# Patient Record
Sex: FEMALE | Race: BLACK OR AFRICAN AMERICAN | NOT HISPANIC OR LATINO | Employment: FULL TIME | URBAN - METROPOLITAN AREA
[De-identification: names, ages, dates, MRNs, and addresses within clinical notes are randomized per-mention and may not be internally consistent; named-entity substitution may affect disease eponyms.]

---

## 2017-05-02 ENCOUNTER — APPOINTMENT (OUTPATIENT)
Dept: WOMENS IMAGING | Facility: HOSPITAL | Age: 52
End: 2017-05-02

## 2017-05-02 PROCEDURE — G0202 SCR MAMMO BI INCL CAD: HCPCS | Performed by: RADIOLOGY

## 2017-05-17 ENCOUNTER — HOSPITAL ENCOUNTER (OUTPATIENT)
Dept: SLEEP MEDICINE | Facility: HOSPITAL | Age: 52
Discharge: HOME | End: 2017-05-17
Admitting: FAMILY MEDICINE

## 2017-05-17 PROCEDURE — 99213 OFFICE O/P EST LOW 20 MIN: CPT | Performed by: INTERNAL MEDICINE

## 2017-05-17 PROCEDURE — G0463 HOSPITAL OUTPT CLINIC VISIT: HCPCS

## 2017-09-28 ENCOUNTER — TELEPHONE (OUTPATIENT)
Dept: PULMONOLOGY | Facility: HOSPITAL | Age: 52
End: 2017-09-28

## 2017-09-28 NOTE — TELEPHONE ENCOUNTER
[facility] requested a refill on modafinil 200 mg one by mouth every morning #90 with 1 refill completed.

## 2018-01-31 ENCOUNTER — APPOINTMENT (OUTPATIENT)
Dept: SLEEP MEDICINE | Facility: HOSPITAL | Age: 53
End: 2018-01-31
Attending: INTERNAL MEDICINE

## 2018-02-15 ENCOUNTER — OFFICE VISIT (OUTPATIENT)
Dept: SLEEP MEDICINE | Facility: HOSPITAL | Age: 53
End: 2018-02-15
Attending: INTERNAL MEDICINE

## 2018-02-15 DIAGNOSIS — G47.11 IDIOPATHIC HYPERSOMNOLENCE: ICD-10-CM

## 2018-02-15 DIAGNOSIS — G47.419 PRIMARY NARCOLEPSY WITHOUT CATAPLEXY: Primary | ICD-10-CM

## 2018-02-15 PROCEDURE — 99213 OFFICE O/P EST LOW 20 MIN: CPT | Performed by: INTERNAL MEDICINE

## 2018-02-15 PROCEDURE — G0463 HOSPITAL OUTPT CLINIC VISIT: HCPCS

## 2018-02-15 RX ORDER — ARMODAFINIL 250 MG/1
250 TABLET ORAL DAILY
Qty: 90 TABLET | Refills: 0 | Status: SHIPPED | OUTPATIENT
Start: 2018-02-15 | End: 2018-05-16

## 2018-02-15 NOTE — PROGRESS NOTES
Follow Up [facility] Note       Patient Care Team:  Clara Ann Pallares, MD as PCP - General (Internal Medicine)  Gordo Mcmahan MD as Consulting Physician (Sleep Medicine)    Chief Complaint:  Hypersomnolence      Interval History:   The patient was last seen by me in May 2017.  She is unchanged and she still states she has complaints of excessive sleepiness.  She also states she is experiencing insomnia more?  She goes to bed at 10:30 PM and awakens around 9 AM.  She awakens by her alarm.  Her [place] Sleepiness Scale is abnormal at 20.    On weekends she works between 8 and 9 AM until 5:30 PM.  She is all Monday Tuesday and Wednesday and Friday.  On Thursday she works 4 PM to 10:30 PM.    She states she has been diagnosed prediabetic and prehypertensive.    Review of Systems:  Recorded on the Sleep Questionnaire.  Unremarkable except for anxiety.    Social History:  She has 1 caffeinated beverage a week.  Social History     Social History   • Marital status:      Spouse name: N/A   • Number of children: N/A   • Years of education: N/A     Social History Main Topics   • Smoking status: Never Smoker   • Smokeless tobacco: Never Used   • Alcohol use No   • Drug use: No   • Sexual activity: Not Asked     Other Topics Concern   • None     Social History Narrative   • None       Allergies:  Review of patient's allergies indicates no known allergies.     Medication Review:  Reviewed.  Modafinil 200 mg every morning and certrizine at bedtime.    Vital Signs:  Height 60 inches and weight 155 and she is obese with a body mass index of 30-31.    Physical Exam:    Constitutional:  Well developed black female and appears in no apparent distress.  Awake & oriented times 3.  Normal mood with normal recent and remote memory and normal judgement.  Eyes:  Conjunctivae normal.  Oropharynx:  moist mucous membranes without exudate and a large tongue and class III MP airway and posterior pharyngeal region not  well seen.      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      Plan:  Good sleep hygiene measures should be maintained.  Weight loss would be beneficial in this patient who is obese by BMI.  The patient is benefiting from the treatment being provided.     The patient will continue modafinil 200 mg by mouth every morning.  However, she wishes to go back to armodafinil 250 mg by mouth every morning with her next prescription.  If she uses armodafinil, she should use one half or one tablet by mouth every morning.    The patient will call for any problems and will follow up in 6 or 8 months.      Gordo Mcmahan MD  02/17/18  11:49 AM

## 2018-02-17 PROBLEM — G47.419 NARCOLEPTIC SYNDROME: Status: ACTIVE | Noted: 2018-02-17

## 2018-02-17 PROBLEM — G47.11 IDIOPATHIC HYPERSOMNOLENCE: Status: ACTIVE | Noted: 2018-02-17

## 2018-02-17 PROBLEM — G47.14 HYPERSOMNIA DUE TO MEDICAL CONDITION: Status: RESOLVED | Noted: 2018-02-17 | Resolved: 2018-02-17

## 2018-02-17 PROBLEM — G47.14 HYPERSOMNIA DUE TO MEDICAL CONDITION: Status: ACTIVE | Noted: 2018-02-17

## 2018-05-29 ENCOUNTER — APPOINTMENT (OUTPATIENT)
Dept: WOMENS IMAGING | Facility: HOSPITAL | Age: 53
End: 2018-05-29

## 2018-05-29 PROCEDURE — 77063 BREAST TOMOSYNTHESIS BI: CPT | Performed by: RADIOLOGY

## 2018-05-29 PROCEDURE — 77067 SCR MAMMO BI INCL CAD: CPT | Performed by: RADIOLOGY

## 2018-06-22 RX ORDER — MODAFINIL 200 MG/1
TABLET ORAL
Qty: 30 TABLET | Refills: 1 | Status: SHIPPED | OUTPATIENT
Start: 2018-06-22 | End: 2018-10-21 | Stop reason: SDUPTHER

## 2018-10-21 RX ORDER — MODAFINIL 200 MG/1
TABLET ORAL
Qty: 30 TABLET | Refills: 1 | Status: SHIPPED | OUTPATIENT
Start: 2018-10-21 | End: 2019-02-20

## 2019-02-20 RX ORDER — MODAFINIL 200 MG/1
TABLET ORAL
Qty: 90 TABLET | Refills: 0 | Status: SHIPPED | OUTPATIENT
Start: 2019-02-20 | End: 2019-03-06

## 2019-02-27 ENCOUNTER — APPOINTMENT (OUTPATIENT)
Dept: SLEEP MEDICINE | Facility: HOSPITAL | Age: 54
End: 2019-02-27
Attending: INTERNAL MEDICINE

## 2019-03-06 ENCOUNTER — OFFICE VISIT (OUTPATIENT)
Dept: SLEEP MEDICINE | Facility: HOSPITAL | Age: 54
End: 2019-03-06
Attending: INTERNAL MEDICINE

## 2019-03-06 VITALS — BODY MASS INDEX: 29.33 KG/M2 | HEIGHT: 60 IN | WEIGHT: 149.4 LBS

## 2019-03-06 DIAGNOSIS — G47.419 PRIMARY NARCOLEPSY WITHOUT CATAPLEXY: Primary | ICD-10-CM

## 2019-03-06 DIAGNOSIS — G47.11 IDIOPATHIC HYPERSOMNOLENCE: ICD-10-CM

## 2019-03-06 PROCEDURE — 99213 OFFICE O/P EST LOW 20 MIN: CPT | Performed by: INTERNAL MEDICINE

## 2019-03-06 PROCEDURE — G0463 HOSPITAL OUTPT CLINIC VISIT: HCPCS

## 2019-03-06 RX ORDER — MODAFINIL 200 MG/1
TABLET ORAL
Qty: 90 TABLET | Refills: 0 | Status: SHIPPED | OUTPATIENT
Start: 2019-03-06 | End: 2019-09-23 | Stop reason: SDUPTHER

## 2019-03-06 RX ORDER — CETIRIZINE HYDROCHLORIDE 10 MG/1
10 TABLET ORAL DAILY
COMMUNITY

## 2019-03-06 NOTE — PROGRESS NOTES
"Follow Up [facility] Note     Chief Complaint: Hypersomnolence    Primary Care Physician: Pallares, Clara Ann, MD    Interval History:   I last saw the patient 2/15/2018.  She reports she is unchanged.  However, since September, she has had some increasing insomnia?  She goes to bed around midnight and awakens around 8:30 AM.  [place] Sleepiness Scale is still abnormal at 18.  She continues to work the same work shift as before.    Review of Systems:  Recorded on the Sleep Questionnaire.  Unremarkable     Social History:    Social History     Socioeconomic History   • Marital status:      Spouse name: Not on file   • Number of children: Not on file   • Years of education: Not on file   • Highest education level: Not on file   Tobacco Use   • Smoking status: Never Smoker   • Smokeless tobacco: Never Used   Substance and Sexual Activity   • Alcohol use: No   • Drug use: No       Allergies:  Patient has no known allergies.     Medication Review:  Reviewed.      Vital Signs:    Vitals:    03/06/19 1148   Weight: 67.8 kg (149 lb 6.4 oz)   Height: 152.4 cm (60\")     Body mass index is 29.18 kg/m².    Physical Exam:    Constitutional:  Well developed 53 y.o. female that appears in no apparent distress.  Awake & oriented times 3.  Normal mood with normal recent and remote memory and normal judgement.  Eyes:  Conjunctivae normal.  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    Plan:  Good sleep hygiene measures should be maintained.  Weight loss would be beneficial in this patient who is nearly obese by BMI.  The patient is benefiting " from the treatment being provided.     The patient will continue modafinil 200 mg at the beginning of her work shift.  If she has complaints of insomnia the night before, she should only take one half tab.    The patient will call for any problems and will follow up in 9 months to 1 year.      Gordo Mcmahan MD  Sleep Medicine  03/06/19  12:25 PM

## 2019-05-17 ENCOUNTER — APPOINTMENT (OUTPATIENT)
Dept: WOMENS IMAGING | Facility: HOSPITAL | Age: 54
End: 2019-05-17

## 2019-05-17 PROCEDURE — G0279 TOMOSYNTHESIS, MAMMO: HCPCS | Performed by: RADIOLOGY

## 2019-05-17 PROCEDURE — 77066 DX MAMMO INCL CAD BI: CPT | Performed by: RADIOLOGY

## 2019-05-17 PROCEDURE — 76641 ULTRASOUND BREAST COMPLETE: CPT | Performed by: RADIOLOGY

## 2019-09-23 RX ORDER — MODAFINIL 200 MG/1
TABLET ORAL
Qty: 90 TABLET | Refills: 0 | Status: SHIPPED | OUTPATIENT
Start: 2019-09-23 | End: 2020-01-07 | Stop reason: SDUPTHER

## 2020-01-07 DIAGNOSIS — G47.419 PRIMARY NARCOLEPSY WITHOUT CATAPLEXY: Primary | ICD-10-CM

## 2020-01-07 RX ORDER — MODAFINIL 200 MG/1
TABLET ORAL
Qty: 90 TABLET | Refills: 0 | Status: SHIPPED | OUTPATIENT
Start: 2020-01-07 | End: 2020-03-23 | Stop reason: SDUPTHER

## 2020-03-23 DIAGNOSIS — G47.419 PRIMARY NARCOLEPSY WITHOUT CATAPLEXY: ICD-10-CM

## 2020-03-23 RX ORDER — MODAFINIL 200 MG/1
TABLET ORAL
Qty: 90 TABLET | Refills: 0 | Status: SHIPPED | OUTPATIENT
Start: 2020-03-23 | End: 2020-07-09 | Stop reason: SDUPTHER

## 2020-04-23 ENCOUNTER — APPOINTMENT (OUTPATIENT)
Dept: SLEEP MEDICINE | Facility: HOSPITAL | Age: 55
End: 2020-04-23

## 2020-07-09 DIAGNOSIS — G47.419 PRIMARY NARCOLEPSY WITHOUT CATAPLEXY: ICD-10-CM

## 2020-07-09 RX ORDER — MODAFINIL 200 MG/1
TABLET ORAL
Qty: 90 TABLET | Refills: 0 | Status: SHIPPED | OUTPATIENT
Start: 2020-07-09 | End: 2020-10-09 | Stop reason: SDUPTHER

## 2020-08-13 ENCOUNTER — OFFICE VISIT (OUTPATIENT)
Dept: SLEEP MEDICINE | Facility: HOSPITAL | Age: 55
End: 2020-08-13

## 2020-08-13 VITALS — HEIGHT: 60 IN | BODY MASS INDEX: 28.9 KG/M2 | WEIGHT: 147.2 LBS | OXYGEN SATURATION: 98 %

## 2020-08-13 DIAGNOSIS — G47.419 PRIMARY NARCOLEPSY WITHOUT CATAPLEXY: Primary | ICD-10-CM

## 2020-08-13 DIAGNOSIS — G47.11 IDIOPATHIC HYPERSOMNOLENCE: ICD-10-CM

## 2020-08-13 PROCEDURE — 99213 OFFICE O/P EST LOW 20 MIN: CPT | Performed by: INTERNAL MEDICINE

## 2020-08-13 PROCEDURE — G0463 HOSPITAL OUTPT CLINIC VISIT: HCPCS

## 2020-08-13 NOTE — PROGRESS NOTES
"Follow Up [facility] Note     Chief Complaint: Hypersomnolence    Primary Care Physician: Pallares, Clara Ann, MD    Interval History:   I last saw the patient 3/6/2019.  She reports she is worse.  However, anxiety has increased; stress has increased; and she has some memory issues. She goes to bed around 11 PM and awakens around 9 AM.  [place] Sleepiness Scale is still abnormal at 19.  She continues to work the same work shift as before.    The patient takes armodafinil 250 mg between 9 AM and 10 AM.    Review of Systems:  A complete review of systems was done and all were negative with the exception of some anxiety and depression      Social History:    Social History     Socioeconomic History   • Marital status:      Spouse name: Not on file   • Number of children: Not on file   • Years of education: Not on file   • Highest education level: Not on file   Tobacco Use   • Smoking status: Never Smoker   • Smokeless tobacco: Never Used   Substance and Sexual Activity   • Alcohol use: No   • Drug use: No       Allergies:  Patient has no known allergies.     Medication Review:  Reviewed.      Vital Signs:    Vitals:    08/13/20 0900   SpO2: 98%   Weight: 66.8 kg (147 lb 3.2 oz)   Height: 152.4 cm (60\")     Body mass index is 28.75 kg/m².    Physical Exam:    Constitutional:  Well developed 54 y.o. female that appears in no apparent distress.  Awake & oriented times 3.  Normal mood with normal recent and remote memory and normal judgement.  Eyes:  Conjunctivae normal.  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    Plan:  Good " sleep hygiene measures should be maintained.  Weight loss would be beneficial in this patient who is nearly obese by BMI.  The patient is benefiting from the treatment being provided.     The patient will continue armodafinil 250 mg at the beginning of her work shift.  If she has complaints of insomnia the night before, she should only take one half tab.    The patient will call for any problems and will follow up in 9 months to 1 year.      Gordo Mcmahan MD  Sleep Medicine  08/13/20  10:41

## 2020-10-09 DIAGNOSIS — G47.419 PRIMARY NARCOLEPSY WITHOUT CATAPLEXY: ICD-10-CM

## 2020-10-09 RX ORDER — MODAFINIL 200 MG/1
TABLET ORAL
Qty: 90 TABLET | Refills: 0 | Status: SHIPPED | OUTPATIENT
Start: 2020-10-09 | End: 2021-01-25 | Stop reason: SDUPTHER

## 2021-01-25 DIAGNOSIS — G47.419 PRIMARY NARCOLEPSY WITHOUT CATAPLEXY: ICD-10-CM

## 2021-01-25 RX ORDER — MODAFINIL 200 MG/1
TABLET ORAL
Qty: 90 TABLET | Refills: 0 | Status: SHIPPED | OUTPATIENT
Start: 2021-01-25 | End: 2021-02-03 | Stop reason: SDUPTHER

## 2021-02-03 DIAGNOSIS — G47.419 PRIMARY NARCOLEPSY WITHOUT CATAPLEXY: ICD-10-CM

## 2021-02-03 RX ORDER — MODAFINIL 200 MG/1
TABLET ORAL
Qty: 90 TABLET | Refills: 0 | Status: SHIPPED | OUTPATIENT
Start: 2021-02-03 | End: 2021-05-18 | Stop reason: SDUPTHER

## 2021-03-24 ENCOUNTER — BULK ORDERING (OUTPATIENT)
Dept: CASE MANAGEMENT | Facility: OTHER | Age: 56
End: 2021-03-24

## 2021-03-24 DIAGNOSIS — Z23 IMMUNIZATION DUE: ICD-10-CM

## 2021-05-18 DIAGNOSIS — G47.419 PRIMARY NARCOLEPSY WITHOUT CATAPLEXY: ICD-10-CM

## 2021-05-18 RX ORDER — MODAFINIL 200 MG/1
TABLET ORAL
Qty: 90 TABLET | Refills: 0 | Status: SHIPPED | OUTPATIENT
Start: 2021-05-18 | End: 2021-08-11 | Stop reason: SDUPTHER

## 2021-05-19 ENCOUNTER — APPOINTMENT (OUTPATIENT)
Dept: SLEEP MEDICINE | Facility: HOSPITAL | Age: 56
End: 2021-05-19

## 2021-05-20 ENCOUNTER — OFFICE VISIT (OUTPATIENT)
Dept: SLEEP MEDICINE | Facility: HOSPITAL | Age: 56
End: 2021-05-20

## 2021-05-20 VITALS — HEIGHT: 60 IN | HEART RATE: 87 BPM | OXYGEN SATURATION: 100 % | BODY MASS INDEX: 29.45 KG/M2 | WEIGHT: 150 LBS

## 2021-05-20 DIAGNOSIS — G47.11 IDIOPATHIC HYPERSOMNOLENCE: Primary | ICD-10-CM

## 2021-05-20 DIAGNOSIS — G47.419 PRIMARY NARCOLEPSY WITHOUT CATAPLEXY: ICD-10-CM

## 2021-05-20 PROCEDURE — G0463 HOSPITAL OUTPT CLINIC VISIT: HCPCS

## 2021-05-20 PROCEDURE — 99213 OFFICE O/P EST LOW 20 MIN: CPT | Performed by: INTERNAL MEDICINE

## 2021-08-11 ENCOUNTER — APPOINTMENT (OUTPATIENT)
Dept: WOMENS IMAGING | Facility: HOSPITAL | Age: 56
End: 2021-08-11

## 2021-08-11 DIAGNOSIS — G47.419 PRIMARY NARCOLEPSY WITHOUT CATAPLEXY: ICD-10-CM

## 2021-08-11 PROCEDURE — 77067 SCR MAMMO BI INCL CAD: CPT | Performed by: RADIOLOGY

## 2021-08-11 PROCEDURE — 77063 BREAST TOMOSYNTHESIS BI: CPT | Performed by: RADIOLOGY

## 2021-08-11 RX ORDER — MODAFINIL 200 MG/1
TABLET ORAL
Qty: 90 TABLET | Refills: 1 | Status: SHIPPED | OUTPATIENT
Start: 2021-08-11 | End: 2022-02-12 | Stop reason: SDUPTHER

## 2022-02-12 DIAGNOSIS — G47.419 PRIMARY NARCOLEPSY WITHOUT CATAPLEXY: ICD-10-CM

## 2022-02-12 RX ORDER — MODAFINIL 200 MG/1
TABLET ORAL
Qty: 90 TABLET | Refills: 1 | Status: SHIPPED | OUTPATIENT
Start: 2022-02-12 | End: 2022-08-29 | Stop reason: SDUPTHER

## 2022-03-02 ENCOUNTER — APPOINTMENT (OUTPATIENT)
Dept: SLEEP MEDICINE | Facility: HOSPITAL | Age: 57
End: 2022-03-02

## 2022-03-23 ENCOUNTER — OFFICE VISIT (OUTPATIENT)
Dept: SLEEP MEDICINE | Facility: HOSPITAL | Age: 57
End: 2022-03-23

## 2022-03-23 VITALS — HEIGHT: 60 IN | BODY MASS INDEX: 29.45 KG/M2 | WEIGHT: 150 LBS

## 2022-03-23 DIAGNOSIS — G47.419 PRIMARY NARCOLEPSY WITHOUT CATAPLEXY: ICD-10-CM

## 2022-03-23 DIAGNOSIS — G47.11 IDIOPATHIC HYPERSOMNOLENCE: Primary | ICD-10-CM

## 2022-03-23 PROCEDURE — G0463 HOSPITAL OUTPT CLINIC VISIT: HCPCS

## 2022-03-23 PROCEDURE — 99213 OFFICE O/P EST LOW 20 MIN: CPT | Performed by: INTERNAL MEDICINE

## 2022-03-23 NOTE — PROGRESS NOTES
"Follow Up [facility] Note     Chief Complaint:  RICHARD     Primary Care Physician: Tara Mcmahan APRN    Interval History:   The patient is a 56 y.o. female  who I last saw 5/20/2021 and that note was reviewed.  Since last seen, patient states she is worse?  She now works 8 AM to 4:30 PM.  She goes to bed around 10 PM and she falls asleep easily.  She has difficulty staying asleep.  She awakens at 4 AM.  She will use the restroom during that time.  She is having complaints of hot flashes and her hemoglobin A1c is elevated 6.1.    The patient continues to take modafinil 200 mg one tab in the morning and another half or one tab around noon    Self-administered [place] Sleepiness Scale test results: 18, previously 20  0-5 Lower normal daytime sleepiness  6-10 Higher normal daytime sleepiness  11-12 Mild, 13-15 Moderate, & 16-24 Severe excessive daytime sleepiness    Review of Systems:    A complete review of systems was done and all were negative with the exception of some anxiety    Social History:    Social History     Socioeconomic History   • Marital status:    Tobacco Use   • Smoking status: Never Smoker   • Smokeless tobacco: Never Used   Substance and Sexual Activity   • Alcohol use: No   • Drug use: No       Allergies:  Patient has no known allergies.     Medication Review:  Reviewed.  Meloxicam and modafinil    Vital Signs:    Vitals:    03/23/22 1100   Weight: 68 kg (150 lb)   Height: 152.4 cm (60\")     Body mass index is 29.29 kg/m².    Physical Exam:    Constitutional:  Well developed 56 y.o. female that appears in no apparent distress.  Awake & oriented times 3.  Normal mood with normal recent and remote memory and normal judgement.  Eyes:  Conjunctivae normal.  Oropharynx: Previously, moist mucous membranes without exudate and a large tongue and class III Mallampati airway, patient is wearing a facemask.     Impression:   Hypersomnolence probably secondary to narcolepsy " without cataplexy, versus idiopathic hypersomnolence,  based on previous testing.  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    Plan:  Good sleep hygiene measures should be maintained.  Weight loss would be beneficial in this patient who is nearly obese by BMI.      After evaluating the patient and assessing results available, the patient is benefiting from the treatment being provided.     The patient will continue modafinil 200 mg, 1 tab in the morning and 1/2-1 tab around noon.  The patient may take her tablet at 4 AM prior to getting up later in the morning.  She will take one half or 1 tablet around noon.  She will call when she needs a prescription.  Jerry reviewed and there are no irregularities.      I answered all of the patient's questions.  The patient will call for any problems and will follow up in 6-8 months.      Gordo Mcmahan MD  Sleep Medicine  03/23/22  11:36 EDT

## 2022-08-29 DIAGNOSIS — G47.419 PRIMARY NARCOLEPSY WITHOUT CATAPLEXY: ICD-10-CM

## 2022-08-29 RX ORDER — MODAFINIL 200 MG/1
TABLET ORAL
Qty: 90 TABLET | Refills: 1 | Status: SHIPPED | OUTPATIENT
Start: 2022-08-29 | End: 2022-11-09 | Stop reason: SDUPTHER

## 2022-09-01 ENCOUNTER — OFFICE VISIT (OUTPATIENT)
Dept: SLEEP MEDICINE | Facility: HOSPITAL | Age: 57
End: 2022-09-01

## 2022-09-01 VITALS — HEART RATE: 87 BPM | WEIGHT: 148.6 LBS | BODY MASS INDEX: 29.17 KG/M2 | OXYGEN SATURATION: 99 % | HEIGHT: 60 IN

## 2022-09-01 DIAGNOSIS — G47.11 IDIOPATHIC HYPERSOMNOLENCE: Primary | ICD-10-CM

## 2022-09-01 DIAGNOSIS — G47.419 PRIMARY NARCOLEPSY WITHOUT CATAPLEXY: ICD-10-CM

## 2022-09-01 PROCEDURE — G0463 HOSPITAL OUTPT CLINIC VISIT: HCPCS

## 2022-09-01 PROCEDURE — 99213 OFFICE O/P EST LOW 20 MIN: CPT | Performed by: INTERNAL MEDICINE

## 2022-09-01 NOTE — PROGRESS NOTES
"Follow Up [facility] Disorders [facility] Note     Chief Complaint:  Hypersomnolence     Primary Care Physician: Tara Mcmahan APRN    Interval History:   The patient is a 56 y.o. female  who I last saw 3/23/2022 and that note was reviewed.  Presently, the patient reports increased stress which has caused increased sleepiness.  She also reports increased anxiety, hot flashes, and blood pressure at times.  She goes to bed between 9 or 10 PM and falls asleep without difficulties.  She awakens between 4 and 6 AM.    She continues to take modafinil 200 mg every morning and 1/2 tablet around noon time.    Review of Systems:    A complete review of systems was done and all were negative with the exception of some anxiety and depression    Social History:    Social History     Socioeconomic History   • Marital status:    Tobacco Use   • Smoking status: Never Smoker   • Smokeless tobacco: Never Used   Substance and Sexual Activity   • Alcohol use: No   • Drug use: No       Allergies:  Patient has no known allergies.     Medication Review:  Reviewed.      Vital Signs:    Vitals:    09/01/22 0900   Pulse: 87   SpO2: 99%   Weight: 67.4 kg (148 lb 9.6 oz)   Height: 152.4 cm (60\")     Body mass index is 29.02 kg/m².    Physical Exam:    Constitutional:  Well developed 56 y.o. female that appears in no apparent distress.  Awake & oriented times 3.  Normal mood with normal recent and remote memory and normal judgement.  Eyes:  Conjunctivae normal.  Oropharynx: Previously, moist mucous membranes without exudate and a large tongue and class III Mallampati airway, patient is wearing a facemask.    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    Plan:  Good sleep hygiene measures should be maintained.  Weight loss would be beneficial in this patient who is nearly obese by BMI.      After evaluating the patient and assessing results available, the patient is benefiting from the treatment being provided.     The patient will continue modafinil 200 mg, 1 in the morning and 1 around noon as she is doing.  Jerry reviewed and there are no irregularities.      I answered all of the patient's questions.  The patient will call for any problems and will follow up in 6-8 months.      Gordo Mcmahan MD  Sleep Medicine  09/01/22  11:15 EDT

## 2022-11-09 DIAGNOSIS — G47.419 PRIMARY NARCOLEPSY WITHOUT CATAPLEXY: ICD-10-CM

## 2022-11-09 RX ORDER — MODAFINIL 200 MG/1
TABLET ORAL
Qty: 135 TABLET | Refills: 1 | Status: SHIPPED | OUTPATIENT
Start: 2022-11-09

## 2023-05-11 DIAGNOSIS — G47.419 PRIMARY NARCOLEPSY WITHOUT CATAPLEXY: ICD-10-CM

## 2023-05-11 RX ORDER — MODAFINIL 200 MG/1
TABLET ORAL
Qty: 135 TABLET | Refills: 1 | Status: SHIPPED | OUTPATIENT
Start: 2023-05-11

## 2023-05-18 ENCOUNTER — OFFICE VISIT (OUTPATIENT)
Dept: SLEEP MEDICINE | Facility: HOSPITAL | Age: 58
End: 2023-05-18
Payer: MEDICARE

## 2023-05-18 VITALS — WEIGHT: 155 LBS | OXYGEN SATURATION: 99 % | HEART RATE: 101 BPM | HEIGHT: 60 IN | BODY MASS INDEX: 30.43 KG/M2

## 2023-05-18 DIAGNOSIS — G47.419 PRIMARY NARCOLEPSY WITHOUT CATAPLEXY: ICD-10-CM

## 2023-05-18 DIAGNOSIS — G47.11 IDIOPATHIC HYPERSOMNOLENCE: Primary | ICD-10-CM

## 2023-05-18 PROCEDURE — G0463 HOSPITAL OUTPT CLINIC VISIT: HCPCS

## 2023-05-18 NOTE — PROGRESS NOTES
"Follow Up [facility] Disorders [facility] Note     Chief Complaint: Hypersomnolence     Primary Care Physician: Tara Mcmahan APRN    Interval History:   The patient is a 57 y.o. female  who I last saw 9/1/2022 and that note was reviewed.  The patient reports she is unchanged without new complaints.  She goes to bed at 10 PM and gets out of bed at 6 AM.  She will use the restroom during that time.    The patient continues to take modafinil 200 mg every morning and one half tab around noon time.    Review of Systems:    A complete review of systems was done and all were negative with the exception of some anxiety and depression    Social History:    Social History     Socioeconomic History   • Marital status:    Tobacco Use   • Smoking status: Never   • Smokeless tobacco: Never   Substance and Sexual Activity   • Alcohol use: No   • Drug use: No       Allergies:  Patient has no known allergies.     Medication Review:  Reviewed.      Vital Signs:    Vitals:    05/18/23 1139   Pulse: 101   SpO2: 99%   Weight: 70.3 kg (155 lb)   Height: 152.4 cm (60\")     Body mass index is 30.27 kg/m².    Physical Exam:    Constitutional:  Well developed 57 y.o. female that appears in no apparent distress.  Awake & oriented times 3.  Normal mood with normal recent and remote memory and normal judgement.  Eyes:  Conjunctivae normal.  Oropharynx: Previously, moist mucous membranes without exudate and a large tongue and class III Mallampati airway.    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    Overnight polysomnogram 7/13/2007, weight 139 pounds, normal AHI at 0.9 events per hour.  And no sleep-related hypoxia.  Latency to sleep onset less than 1 minute and latency to stage room 78 minutes.  Multiple sleep latency test performed 8/3/2007.  The patient demonstrated sleep on all 4 Naps with a mean sleep latency of 5.4 minutes.  No REM onset sleep noted.    Plan:  Good sleep hygiene measures should be maintained.  Weight loss would be beneficial in this patient who is obese by Body mass index is 30.27 kg/m²..      After evaluating the patient and assessing results available, the patient is benefiting from the treatment being provided.     The patient will continue modafinil 200 mg every morning and one half tab around noon time.  She may take 1 whole tab if needed.  Jerry reviewed and there are no irregularities.  The patient or her pharmacy will call me when refills are needed.      I answered all of the patient's questions.  The patient will call the Sleep Disorder Center for any problems with side effects of medical therapy and will follow up in 6 months.      Gordo Mcmahan MD  Sleep Medicine  05/18/23  11:42 EDT

## 2023-11-06 DIAGNOSIS — G47.419 PRIMARY NARCOLEPSY WITHOUT CATAPLEXY: ICD-10-CM

## 2023-11-06 RX ORDER — MODAFINIL 200 MG/1
TABLET ORAL
Qty: 135 TABLET | Refills: 1 | Status: SHIPPED | OUTPATIENT
Start: 2023-11-06

## 2023-11-16 ENCOUNTER — OFFICE VISIT (OUTPATIENT)
Dept: SLEEP MEDICINE | Facility: HOSPITAL | Age: 58
End: 2023-11-16
Payer: MEDICARE

## 2023-11-16 VITALS — WEIGHT: 157 LBS | HEART RATE: 110 BPM | BODY MASS INDEX: 30.82 KG/M2 | OXYGEN SATURATION: 96 % | HEIGHT: 60 IN

## 2023-11-16 DIAGNOSIS — G47.419 PRIMARY NARCOLEPSY WITHOUT CATAPLEXY: ICD-10-CM

## 2023-11-16 DIAGNOSIS — G47.11 IDIOPATHIC HYPERSOMNOLENCE: Primary | ICD-10-CM

## 2023-11-16 PROCEDURE — G0463 HOSPITAL OUTPT CLINIC VISIT: HCPCS

## 2023-11-16 NOTE — PROGRESS NOTES
"Follow Up Sleep Disorders [facility] Note     Chief Complaint: Hypersomnolence      Primary Care Physician: Tara Mcmahan APRN    Interval History:   The patient is a 58 y.o. female  who I last saw 5/18/2023 and that note was reviewed.  The patient is mostly unchanged.  However, she has been having some increasing complaints of insomnia due to the recent time change, falling back.  Also, she reports menopausal changes.  She goes to bed at 10 PM and gets up between 6 and 8 AM.  She will use the restroom during that time.  The patient works Monday and Tuesday between 9 AM and 5:30 PM.    She continues to take modafinil 200 mg in the morning and 100 mg at noon.    Review of Systems:    A complete review of systems was done and all were negative with the exception of some anxiety and depression    Social History:    Social History     Socioeconomic History    Marital status:    Tobacco Use    Smoking status: Never    Smokeless tobacco: Never   Substance and Sexual Activity    Alcohol use: No    Drug use: No       Allergies:  Patient has no known allergies.     Medication Review:  Reviewed.      Vital Signs:    Vitals:    11/16/23 1420   Pulse: 110   SpO2: 96%   Weight: 71.2 kg (157 lb)   Height: 152.4 cm (60\")     Body mass index is 30.66 kg/m².    Physical Exam:    Constitutional:  Well developed 58 y.o. female that appears in no apparent distress.  Awake & oriented times 3.  Normal mood with normal recent and remote memory and normal judgement.  Eyes:  Conjunctivae normal.  Oropharynx: Previously, moist mucous membranes without exudate and a large tongue and class III Mallampati airway.    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     Overnight polysomnogram 7/13/2007, weight 139 pounds, normal AHI at 0.9 events per hour and no sleep-related hypoxia.  Latency to sleep onset less than 1 minute and latency to stage REM sleep 78 minutes.  Multiple sleep latency test performed 8/3/2007.  The patient demonstrated sleep on all 4 Naps with a mean sleep latency of 5.4 minutes.  No REM onset sleep noted.     Plan:  Good sleep hygiene measures should be maintained.  Weight loss would be beneficial in this patient who is obese by Body mass index is 30.66 kg/m²..      After evaluating the patient and assessing results available, the patient is benefiting from the treatment being provided.     The patient will continue modafinil 200 mg in the morning and 100 mg or 200 mg at noon.  Jerry reviewed and there are no irregularities.  We discussed good sleep hygiene measures.  I recommend no changes in medications.  She could try to take her noontime dose a little earlier?      Cetirizine should be taken in the evening.    I answered all of the patient's questions.  The patient will call the [facility] Center for any problems and will follow up in 6 months.      Gordo Mcmahan MD  Sleep Medicine  11/16/23  14:29 EST

## 2024-04-05 ENCOUNTER — PREP FOR SURGERY (OUTPATIENT)
Dept: OTHER | Facility: HOSPITAL | Age: 59
End: 2024-04-05
Payer: MEDICARE

## 2024-04-05 DIAGNOSIS — Z12.11 SCREENING FOR COLON CANCER: Primary | ICD-10-CM

## 2024-04-10 ENCOUNTER — TELEPHONE (OUTPATIENT)
Dept: SURGERY | Facility: CLINIC | Age: 59
End: 2024-04-10

## 2024-04-25 PROBLEM — Z12.11 SCREENING FOR COLON CANCER: Status: ACTIVE | Noted: 2024-04-05

## 2024-05-15 ENCOUNTER — OFFICE VISIT (OUTPATIENT)
Dept: SLEEP MEDICINE | Facility: HOSPITAL | Age: 59
End: 2024-05-15
Payer: MEDICARE

## 2024-05-15 VITALS — HEIGHT: 60 IN | HEART RATE: 70 BPM | BODY MASS INDEX: 29.64 KG/M2 | WEIGHT: 151 LBS | OXYGEN SATURATION: 100 %

## 2024-05-15 DIAGNOSIS — G47.11 IDIOPATHIC HYPERSOMNOLENCE: ICD-10-CM

## 2024-05-15 DIAGNOSIS — G47.14 HYPERSOMNIA DUE TO MEDICAL CONDITION: Primary | ICD-10-CM

## 2024-05-15 DIAGNOSIS — G47.419 PRIMARY NARCOLEPSY WITHOUT CATAPLEXY: ICD-10-CM

## 2024-05-15 PROCEDURE — G0463 HOSPITAL OUTPT CLINIC VISIT: HCPCS

## 2024-05-15 PROCEDURE — 1160F RVW MEDS BY RX/DR IN RCRD: CPT | Performed by: INTERNAL MEDICINE

## 2024-05-15 PROCEDURE — 99213 OFFICE O/P EST LOW 20 MIN: CPT | Performed by: INTERNAL MEDICINE

## 2024-05-15 PROCEDURE — 1159F MED LIST DOCD IN RCRD: CPT | Performed by: INTERNAL MEDICINE

## 2024-05-15 RX ORDER — MODAFINIL 200 MG/1
TABLET ORAL
Qty: 135 TABLET | Refills: 1 | Status: SHIPPED | OUTPATIENT
Start: 2024-05-15

## 2024-05-15 NOTE — PROGRESS NOTES
"Follow Up Sleep Disorders Center Note     Chief Complaint: Hypersomnolence      Primary Care Physician: Tara Mcmahan APRN    Interval History:   The patient is a 58 y.o. female  who I last saw 11/16/2023 and that note was reviewed.  The patient goes to bed between 10 PM and midnight and awakens between 6 and 8 AM.  She still has some complaints of insomnia with hot flashes and anxiety.  She will use the restroom during the nighttime.    She continues to take modafinil 200 mg every morning and one half tab midday    Review of Systems:    A complete review of systems was done and all were negative with the exception of some anxiety and depression    Social History:    Social History     Socioeconomic History    Marital status:    Tobacco Use    Smoking status: Never    Smokeless tobacco: Never   Substance and Sexual Activity    Alcohol use: No    Drug use: No       Allergies:  Patient has no known allergies.     Medication Review:  Reviewed.      Vital Signs:    Vitals:    05/15/24 0900   Pulse: 70   SpO2: 100%   Weight: 68.5 kg (151 lb)   Height: 152.4 cm (60\")     Body mass index is 29.49 kg/m².    Physical Exam:    Constitutional:  Well developed 58 y.o. female that appears in no apparent distress.  Awake & oriented times 3.  Normal mood with normal recent and remote memory and normal judgement.  Eyes:  Conjunctivae normal.  Oropharynx: Previously, moist mucous membranes without exudate and a large tongue and class III Mallampati airway.    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  The patient does have a history of some degree of shiftwork circadian rhythm sleep disorder that could be " contributing to her hypersomnolence, however, that is not as prominent as before.     Overnight polysomnogram 7/13/2007, weight 139 pounds, normal AHI at 0.9 events per hour and no sleep-related hypoxia.  Latency to sleep onset less than 1 minute and latency to stage REM sleep 78 minutes.  Multiple sleep latency test performed 8/3/2007.  The patient demonstrated sleep on all 4 Naps with a mean sleep latency of 5.4 minutes.  No REM onset sleep noted.    Plan:  Good sleep hygiene measures should be maintained.  Weight loss would be beneficial in this patient who is nearly obese by Body mass index is 29.49 kg/m².      After evaluating the patient and assessing results available, the patient is benefiting from the treatment being provided.     The patient will continue modafinil as she is taking it.  Jerry reviewed and there is no irregularities.  I electronically prescribed modafinil refilled.      I answered all of the patient's questions.  The patient will call the [facility] for any problems and will follow up in 6 months.      Gordo Mcmahan MD  Sleep Medicine  05/15/24  11:09 EDT

## 2024-06-26 RX ORDER — ESTRADIOL 0.1 MG/G
CREAM VAGINAL
COMMUNITY
Start: 2024-02-29

## 2024-06-27 ENCOUNTER — HOSPITAL ENCOUNTER (OUTPATIENT)
Facility: HOSPITAL | Age: 59
Setting detail: HOSPITAL OUTPATIENT SURGERY
Discharge: HOME | End: 2024-06-27
Attending: SURGERY | Admitting: SURGERY
Payer: MEDICARE

## 2024-06-27 ENCOUNTER — ANESTHESIA EVENT (OUTPATIENT)
Dept: GASTROENTEROLOGY | Facility: HOSPITAL | Age: 59
End: 2024-06-27
Payer: MEDICARE

## 2024-06-27 ENCOUNTER — ANESTHESIA (OUTPATIENT)
Dept: GASTROENTEROLOGY | Facility: HOSPITAL | Age: 59
End: 2024-06-27
Payer: MEDICARE

## 2024-06-27 VITALS
HEIGHT: 60 IN | DIASTOLIC BLOOD PRESSURE: 80 MMHG | WEIGHT: 149 LBS | OXYGEN SATURATION: 99 % | BODY MASS INDEX: 29.25 KG/M2 | RESPIRATION RATE: 17 BRPM | SYSTOLIC BLOOD PRESSURE: 129 MMHG | HEART RATE: 75 BPM | TEMPERATURE: 98.1 F

## 2024-06-27 DIAGNOSIS — Z12.11 SCREENING FOR COLON CANCER: ICD-10-CM

## 2024-06-27 PROBLEM — K63.5 COLON POLYPS: Status: ACTIVE | Noted: 2024-06-27

## 2024-06-27 PROBLEM — K57.90 DIVERTICULOSIS: Status: ACTIVE | Noted: 2024-06-27

## 2024-06-27 PROCEDURE — 88305 TISSUE EXAM BY PATHOLOGIST: CPT | Performed by: SURGERY

## 2024-06-27 PROCEDURE — 25010000002 PROPOFOL 10 MG/ML EMULSION: Performed by: NURSE ANESTHETIST, CERTIFIED REGISTERED

## 2024-06-27 PROCEDURE — S0260 H&P FOR SURGERY: HCPCS | Performed by: SURGERY

## 2024-06-27 PROCEDURE — 25810000003 LACTATED RINGERS PER 1000 ML: Performed by: SURGERY

## 2024-06-27 PROCEDURE — 45380 COLONOSCOPY AND BIOPSY: CPT | Performed by: SURGERY

## 2024-06-27 PROCEDURE — 25010000002 GLYCOPYRROLATE 0.2 MG/ML SOLUTION: Performed by: NURSE ANESTHETIST, CERTIFIED REGISTERED

## 2024-06-27 RX ORDER — PROPOFOL 10 MG/ML
VIAL (ML) INTRAVENOUS AS NEEDED
Status: DISCONTINUED | OUTPATIENT
Start: 2024-06-27 | End: 2024-06-27 | Stop reason: SURG

## 2024-06-27 RX ORDER — SODIUM CHLORIDE 0.9 % (FLUSH) 0.9 %
10 SYRINGE (ML) INJECTION AS NEEDED
Status: DISCONTINUED | OUTPATIENT
Start: 2024-06-27 | End: 2024-06-27 | Stop reason: HOSPADM

## 2024-06-27 RX ORDER — SODIUM CHLORIDE, SODIUM LACTATE, POTASSIUM CHLORIDE, CALCIUM CHLORIDE 600; 310; 30; 20 MG/100ML; MG/100ML; MG/100ML; MG/100ML
1000 INJECTION, SOLUTION INTRAVENOUS CONTINUOUS
Status: DISCONTINUED | OUTPATIENT
Start: 2024-06-27 | End: 2024-06-27 | Stop reason: HOSPADM

## 2024-06-27 RX ORDER — GLYCOPYRROLATE 0.2 MG/ML
INJECTION INTRAMUSCULAR; INTRAVENOUS AS NEEDED
Status: DISCONTINUED | OUTPATIENT
Start: 2024-06-27 | End: 2024-06-27 | Stop reason: SURG

## 2024-06-27 RX ORDER — LIDOCAINE HYDROCHLORIDE 10 MG/ML
0.5 INJECTION, SOLUTION INFILTRATION; PERINEURAL ONCE AS NEEDED
Status: DISCONTINUED | OUTPATIENT
Start: 2024-06-27 | End: 2024-06-27 | Stop reason: HOSPADM

## 2024-06-27 RX ORDER — LIDOCAINE HYDROCHLORIDE 20 MG/ML
INJECTION, SOLUTION INFILTRATION; PERINEURAL AS NEEDED
Status: DISCONTINUED | OUTPATIENT
Start: 2024-06-27 | End: 2024-06-27 | Stop reason: SURG

## 2024-06-27 RX ORDER — MULTIPLE VITAMINS W/ MINERALS TAB 9MG-400MCG
1 TAB ORAL DAILY
COMMUNITY

## 2024-06-27 RX ADMIN — PROPOFOL 160 MCG/KG/MIN: 10 INJECTION, EMULSION INTRAVENOUS at 09:17

## 2024-06-27 RX ADMIN — PROPOFOL 50 MG: 10 INJECTION, EMULSION INTRAVENOUS at 09:16

## 2024-06-27 RX ADMIN — PROPOFOL 50 MG: 10 INJECTION, EMULSION INTRAVENOUS at 09:20

## 2024-06-27 RX ADMIN — SODIUM CHLORIDE, POTASSIUM CHLORIDE, SODIUM LACTATE AND CALCIUM CHLORIDE 1000 ML: 600; 310; 30; 20 INJECTION, SOLUTION INTRAVENOUS at 08:31

## 2024-06-27 RX ADMIN — LIDOCAINE HYDROCHLORIDE 60 MG: 20 INJECTION, SOLUTION INFILTRATION; PERINEURAL at 09:16

## 2024-06-27 RX ADMIN — GLYCOPYRROLATE 0.2 MG: 0.2 INJECTION INTRAMUSCULAR; INTRAVENOUS at 09:13

## 2024-06-27 NOTE — DISCHARGE INSTRUCTIONS
For the next 24 hours patient needs to be with a responsible adult.    For 24 hours DO NOT drive, operate machinery, appliances, drink alcohol, make important decisions or sign legal documents.    Start with a light or bland diet if you are feeling sick to your stomach otherwise advance to regular diet as tolerated.    Follow recommendations on procedure report if provided by your doctor.    Call Dr Mcclain for problems [phone]    Problems may include but not limited to: large amounts of bleeding, trouble breathing, repeated vomiting, severe unrelieved pain, fever or chills.

## 2024-06-27 NOTE — ANESTHESIA PREPROCEDURE EVALUATION
Anesthesia Evaluation     Patient summary reviewed and Nursing notes reviewed                Airway   Mallampati: I  TM distance: >3 FB  Neck ROM: full  No difficulty expected  Dental - normal exam     Pulmonary - negative pulmonary ROS     ROS comment: Falls to sleep easily  hypersomnelence  Cardiovascular - negative cardio ROS        Neuro/Psych- negative ROS  GI/Hepatic/Renal/Endo - negative ROS     Musculoskeletal (-) negative ROS    Abdominal    Substance History - negative use     OB/GYN negative ob/gyn ROS         Other                    Anesthesia Plan    ASA 2     MAC     intravenous induction     Anesthetic plan, risks, benefits, and alternatives have been provided, discussed and informed consent has been obtained with: patient.    CODE STATUS:

## 2024-06-27 NOTE — OP NOTE
Operative Note :  Federica Mcclain MD      Arin Samuel  1965    Procedure Date: 06/27/24    Pre-op Diagnosis:  Screening for colon cancer [Z12.11]    Post-Operative Diagnosis:  Colon polyp  Diverticulosis    Procedure:   Flexible colonoscopy to the cecum with cold forcep polypectomy    Surgeon: Federica Mcclain MD    Assistant: None    Anesthesia:  MAC (monitored anesthetic care)    Estimated Blood Loss: Minimal    Specimens: Cecal polyp    Complications: None    Indications:  Ms. Samuel is a 58-year-old lady here for repeat screening colonoscopy.  She has been counseled on the risks of the procedure to include bleeding, colon perforation, and missed pathology.  Despite these risks, she has consented to proceed.    Findings: 2 mm cecal polyp removed, pancolonic diverticulosis    Description of procedure:  The patient was brought to the endoscopy suite and shirley in the left lateral decubitus position.  Continuous propofol anesthesia was administered.  A surgical timeout was completed.  A digital rectal exam was performed, revealing no abnormalities.  An adult colonoscope was then inserted through the anus and passed under direct visualization to the level of the cecum.  The cecum was identified via the ileocecal valve as well as the appendiceal orifice.  The scope was then slowly withdrawn, examining all circumferential walls of the ascending, transverse, descending, and sigmoid colon.  There was a 2 mm polyp of the cecum removed using the biopsy forceps and retrieved.  Throughout the colon there were multiple diverticula with no evidence of bleeding or fecal impaction.  These diverticula were located in the ascending, transverse, descending, and sigmoid colon.  Within the rectum the scope was retroflexed and showed no evidence of hemorrhoidal disease.  The scope was then withdrawn and the colon desufflated.  The patient had a very good bowel prep and was transferred to the recovery area in stable  condition.     Recommendations:  I will call the patient in 1 week or less with the pathology results of the one polyp removed as this will determine when the next screening colonoscopy will be due.    Federica Mcclain MD  General, Robotic, and Endoscopic Surgery  [facility] ([facility]    [address], [address]  [place] [address]  P: [phone]  F: [phone]

## 2024-06-27 NOTE — H&P
General Surgery  History and Physical    CC: Screening for colon cancer    HPI: The patient is a pleasant 58 y.o. year-old lady who presents today for a repeat screening colonoscopy.  Her last colonoscopy was done in 2013 by Dr. Byron Horta and significant only for diverticulosis.  She denies any melena or hematochezia and has no family history of colon cancer.    Past Medical History:   Narcolepsy    Past Surgical History:   Hysterectomy  Colonoscopy (2013, Dr. Horta-diverticulosis)    Medications:   Medications Prior to Admission   Medication Sig Dispense Refill Last Dose    BLACK CURRANT SEED OIL PO Take 6 drops by mouth Daily.   Past Week    cetirizine (zyrTEC) 10 MG tablet Take 1 tablet by mouth Daily.   Past Week    Cholecalciferol 50 MCG (2000 UT) tablet Take 1 tablet by mouth Daily.   6/26/2024 at 0900    estradiol (ESTRACE) 0.1 MG/GM vaginal cream INSERT 1 GRAM (DOUBLE PEASIZED AMOUNT VAGINALLY 2-3 TIMES A WEEK AT BEDTIME   Past Week    IODINE, KELP, PO Take 2 drops by mouth Daily.   6/26/2024 at 0900    Iron Combinations (IRON COMPLEX PO) Take 325 mg by mouth Daily.   6/26/2024 at 0900    Methylsulfonylmethane (MSM PO) Take 3 g by mouth Daily.   Past Week    Misc Natural Products (PUMPKIN SEED OIL PO) Take 1 capsule by mouth Daily.   Past Week    modafinil (PROVIGIL) 200 MG tablet Take 1 tablet by mouth every morning and may take another 1/2 tablet in the afternoon for excessive sleepiness. 135 tablet 1 6/26/2024 at 0900    multivitamin with minerals (MULTIVITAMIN ADULT PO) Take 1 tablet by mouth Daily.   6/26/2024 at 0900    Omega-3 Fatty Acids (FISH OIL PO) Take 1 capsule by mouth Daily.   Past Week    VITAMIN E PO Take 2 drops by mouth Daily.   6/26/2024 at 0900       Allergies: No known drug allergies    Family History: No family history of gastrointestinal malignancy in her parents or siblings    Social History: , non-smoker, no regular alcohol use    ROS: A comprehensive review of systems  was conducted and negative for melena or hematochezia  All other systems reviewed and negative    Physical Exam:  Vitals:    06/27/24 0818   BP: 129/80   Pulse: 63   Temp: 98.1 °F (36.7 °C)   SpO2: 99%     Height: 152 cm  Weight: 67 kg  BMI: 29.1  General: No acute distress, well-nourished & well-developed  HEAD: normocephalic, atraumatic  EYES: normal conjunctiva, sclera anicteric  EARS: grossly normal hearing  NECK: supple, no thyromegaly  CARDIOVASCULAR: regular rate and rhythm  RESPIRATORY: clear to auscultation bilaterally  GASTROINTESTINAL: soft, nontender, non-distended  PSYCHIATRIC: oriented x3, normal mood and affect    ASSESSMENT & PLAN  Ms. Samuel is a 58-year-old lady here for repeat screening colonoscopy.  She has been counseled on the risks of the procedure to include bleeding, colon perforation, and missed pathology.  Despite these risks, she has consented to proceed.    Federica Mcclain MD  General, Robotic, and Endoscopic Surgery  [facility] [place]    [address], [address]  [place] [address]  P: [phone]  F: [phone]

## 2024-06-27 NOTE — ANESTHESIA POSTPROCEDURE EVALUATION
Patient: Arin Samuel    Procedure Summary       Date: 06/27/24 Room / Location:  [facility]    Anesthesia Start: 0912 Anesthesia Stop: 0930    Procedure: COLONOSCOPY to the cecum with cold biopsy polypectomy Diagnosis:       Screening for colon cancer      (Screening for colon cancer [Z12.11])    Surgeons: Federica Mcclain MD Provider: Millie Wellington MD    Anesthesia Type: MAC ASA Status: 2            Anesthesia Type: MAC    Vitals  Vitals Value Taken Time   /80 06/27/24 0949   Temp     Pulse 70 06/27/24 0950   Resp 17 06/27/24 0949   SpO2 100 % 06/27/24 0950   Vitals shown include unfiled device data.        Post Anesthesia Care and Evaluation    Patient location during evaluation: bedside  Patient participation: complete - patient participated  Level of consciousness: awake  Pain management: adequate    Airway patency: patent  Anesthetic complications: No anesthetic complications    Cardiovascular status: acceptable  Respiratory status: acceptable  Hydration status: acceptable

## 2024-06-28 LAB
LAB AP CASE REPORT: NORMAL
PATH REPORT.FINAL DX SPEC: NORMAL
PATH REPORT.GROSS SPEC: NORMAL

## 2024-07-01 ENCOUNTER — TELEPHONE (OUTPATIENT)
Dept: SURGERY | Facility: CLINIC | Age: 59
End: 2024-07-01
Payer: MEDICARE

## 2024-07-01 NOTE — TELEPHONE ENCOUNTER
I called Arin and discussed the benign pathology findings from her colonoscopy last week.  The single polyp removed returned as a tubular adenoma which I explained is benign but carries some precancerous potential to it.  I would recommend she needs to have another screening colonoscopy in 5 years for ongoing surveillance.  She expressed understanding.    Oxana, please update this patient's health maintenance tab and recall pool to reflect a 5-year interval.    Thanks,  GABRIEL    
none since injury 3/2020,

## 2024-08-23 ENCOUNTER — TELEPHONE (OUTPATIENT)
Dept: SLEEP MEDICINE | Facility: HOSPITAL | Age: 59
End: 2024-08-23
Payer: MEDICARE

## 2024-11-13 ENCOUNTER — OFFICE VISIT (OUTPATIENT)
Dept: SLEEP MEDICINE | Facility: HOSPITAL | Age: 59
End: 2024-11-13
Payer: MEDICARE

## 2024-11-13 VITALS — HEART RATE: 97 BPM | WEIGHT: 156 LBS | BODY MASS INDEX: 30.63 KG/M2 | HEIGHT: 60 IN | OXYGEN SATURATION: 98 %

## 2024-11-13 DIAGNOSIS — G47.11 IDIOPATHIC HYPERSOMNOLENCE: Primary | ICD-10-CM

## 2024-11-13 DIAGNOSIS — G47.14 HYPERSOMNIA DUE TO MEDICAL CONDITION: ICD-10-CM

## 2024-11-13 DIAGNOSIS — G47.419 PRIMARY NARCOLEPSY WITHOUT CATAPLEXY: ICD-10-CM

## 2024-11-13 PROCEDURE — G0463 HOSPITAL OUTPT CLINIC VISIT: HCPCS

## 2024-11-13 PROCEDURE — 1160F RVW MEDS BY RX/DR IN RCRD: CPT | Performed by: INTERNAL MEDICINE

## 2024-11-13 PROCEDURE — 99213 OFFICE O/P EST LOW 20 MIN: CPT | Performed by: INTERNAL MEDICINE

## 2024-11-13 PROCEDURE — 1159F MED LIST DOCD IN RCRD: CPT | Performed by: INTERNAL MEDICINE

## 2024-11-13 NOTE — PROGRESS NOTES
"Follow Up [facility] Disorders [facility] Note     Chief Complaint: Hypersomnolence      Primary Care Physician: Tara Mcmahan APRN    Interval History:   The patient is a 59 y.o. female who I last saw 5/15/2024 and that note was reviewed.  The patient reports she is worse.  She is having more insomnia.  She is also having more anxiety which worsens her insomnia.  Her bedtime varies.  She gets out of bed between 6 and 8 AM.  She will use the restroom during that time.    She continues to take modafinil 200 mg every morning and a half tab midday.  Her work schedule is 10 or 10:30 AM until 6:30 PM or 7 PM.    Review of Systems:    A complete review of systems was done and all were negative with the exception of some anxiety    Social History:    Social History     Socioeconomic History    Marital status:    Tobacco Use    Smoking status: Never    Smokeless tobacco: Never   Vaping Use    Vaping status: Never Used   Substance and Sexual Activity    Alcohol use: No    Drug use: No    Sexual activity: Defer       Allergies:  Patient has no known allergies.     Medication Review:  Reviewed.      Vital Signs:    Vitals:    11/13/24 1055   Pulse: 97   SpO2: 98%   Weight: 70.8 kg (156 lb)   Height: 152.4 cm (60\")     Body mass index is 30.47 kg/m².    Physical Exam:    Constitutional:  Well developed 59 y.o. female that appears in no apparent distress.  Awake & oriented times 3.  Normal mood with normal recent and remote memory and normal judgement.  Eyes:  Conjunctivae normal.  Oropharynx: Previously, moist mucous membranes without exudate and a large tongue and class III Mallampati airway.    Self-administered [place] Sleepiness Scale test results: 21, previously 16 and prior to that 18  0-5 Lower normal daytime sleepiness  6-10 Higher normal daytime sleepiness  11-12 Mild, 13-15 Moderate, & 16-24 Severe excessive daytime sleepiness     Impression:   Hypersomnolence probably secondary to narcolepsy without " cataplexy, versus idiopathic hypersomnolence.  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     Overnight polysomnogram 7/13/2007, weight 139 pounds, normal AHI at 0.9 events per hour and no sleep-related hypoxia.  Latency to sleep onset less than 1 minute and latency to stage REM sleep 78 minutes.  Multiple sleep latency test performed 8/3/2007.  The patient demonstrated sleep on all 4 Naps with a mean sleep latency of 5.4 minutes.  No REM onset sleep noted.    Plan:  Good sleep hygiene measures should be maintained.  Weight loss would be beneficial in this patient who is obese by Body mass index is 30.47 kg/m².  In 2007, the patient's weight was 139 pounds and today it is 156 pounds.    After evaluating the patient and assessing results available, the patient is benefiting from the treatment being provided.     The patient will continue modafinil as she is taking it.  Jerry reviewed and there is no irregularities.     I did review with the patient that since she is worse, due to her weight gain, repeat home sleep study should be considered.  She states she will try to watch her weight and will determine if she needs a home sleep study at the next visit.    I answered all of the patient's questions.  The patient will call the Sleep Disorder Center for any problems and will follow up in 6 months.      Gordo Mcmahan MD  Sleep Medicine  11/13/24  11:01 EST

## 2024-12-02 DIAGNOSIS — G47.419 PRIMARY NARCOLEPSY WITHOUT CATAPLEXY: ICD-10-CM

## 2024-12-02 RX ORDER — MODAFINIL 200 MG/1
TABLET ORAL
Qty: 135 TABLET | Refills: 1 | Status: SHIPPED | OUTPATIENT
Start: 2024-12-02

## 2025-05-14 ENCOUNTER — OFFICE VISIT (OUTPATIENT)
Dept: SLEEP MEDICINE | Facility: HOSPITAL | Age: 60
End: 2025-05-14
Payer: MEDICARE

## 2025-05-14 VITALS — OXYGEN SATURATION: 100 % | HEART RATE: 76 BPM | BODY MASS INDEX: 30.23 KG/M2 | WEIGHT: 154 LBS | HEIGHT: 60 IN

## 2025-05-14 DIAGNOSIS — G47.14 HYPERSOMNIA DUE TO MEDICAL CONDITION: Primary | ICD-10-CM

## 2025-05-14 DIAGNOSIS — G47.419 PRIMARY NARCOLEPSY WITHOUT CATAPLEXY: ICD-10-CM

## 2025-05-14 DIAGNOSIS — G47.11 IDIOPATHIC HYPERSOMNOLENCE: ICD-10-CM

## 2025-05-14 PROCEDURE — G0463 HOSPITAL OUTPT CLINIC VISIT: HCPCS

## 2025-05-14 NOTE — PROGRESS NOTES
"[facility]  Follow Up Sleep Disorders Center Note     Chief Complaint:  RICHARD     Primary Care Physician: Lalitha Mcpherson NP    Interval History:   The patient is a 59 y.o. female who I last saw 11/13/2024 and that note was reviewed. The patient reports she is the same.  She is snoring more.  She is also having anxiety which worsens her insomnia.  Her bedtime varies but she tries to get in bed around 10 PM.  She gets out of bed around 6 AM.  She will use the restroom during that time.  She works from 9 or 10 AM until 5:30 PM or as late as 7 PM.     She continues to take modafinil 200 mg every morning and a half tab around midday.  Her work schedule is 10 or 10:30 AM until 6:30 PM or 7 PM.    Review of Systems:    A complete review of systems was done and all were negative with the exception of some anxiety and depression occasional ear pain    Social History:    Social History     Socioeconomic History    Marital status:    Tobacco Use    Smoking status: Never    Smokeless tobacco: Never   Vaping Use    Vaping status: Never Used   Substance and Sexual Activity    Alcohol use: No    Drug use: No    Sexual activity: Defer       Allergies:  Patient has no known allergies.     Medication Review:  Reviewed.      Vital Signs:    Vitals:    05/14/25 1000   Pulse: 76   SpO2: 100%   Weight: 69.9 kg (154 lb)   Height: 152.4 cm (60\")     Body mass index is 30.08 kg/m².    Physical Exam:    Constitutional:  Well developed 59 y.o. female that appears in no apparent distress.  Awake & oriented times 3.  Normal mood with normal recent and remote memory and normal judgement.  Eyes:  Conjunctivae normal.  Oropharynx: Previously, moist mucous membranes without exudate and a large tongue and class III Mallampati airway.     Self-administered [place] Sleepiness Scale test results: 17, previously 21 prior to that 16  0-5 Lower normal daytime sleepiness  6-10 Higher normal daytime sleepiness  11-12 Mild, 13-15 " Moderate, & 16-24 Severe excessive daytime sleepiness     Impression:   Hypersomnolence probably secondary to narcolepsy without cataplexy, versus idiopathic hypersomnolence.  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     Overnight polysomnogram 7/13/2007, weight 139 pounds, normal AHI at 0.9 events per hour and no sleep-related hypoxia.  Latency to sleep onset less than 1 minute and latency to stage REM sleep 78 minutes.  Multiple sleep latency test performed 8/3/2007.  The patient demonstrated sleep on all 4 Naps with a mean sleep latency of 5.4 minutes.  No REM onset sleep noted.     Plan:  Good sleep hygiene measures should be maintained.  Weight loss would be beneficial in this patient who is obese by Body mass index is 30.08 kg/m².      BMI is >= 30 and <35. (Class 1 Obesity). The following options were offered after discussion;: information on healthy weight added to patient's after visit summary      After evaluating the patient and assessing results available, the patient is benefiting from the treatment being provided.     The patient will continue modafinil as she is taking it.  Jerry reviewed and there is no irregularities.      I did review with the patient that since she is snoring but her modafinil is still working, due to her weight being stable, repeat home sleep study should be considered if weight increases or modafinil is not effective.  She states she will try to watch her weight and will determine if she needs a home sleep study at the next visit.    I answered all of the patient's questions.  The patient will call the Sleep Disorder Center for any problems and will follow up in 6 months.      Gordo Mcmahan MD  Sleep Medicine  05/14/25  10:02 EDT

## 2025-07-10 DIAGNOSIS — G47.419 PRIMARY NARCOLEPSY WITHOUT CATAPLEXY: ICD-10-CM

## 2025-07-10 RX ORDER — MODAFINIL 200 MG/1
TABLET ORAL
Qty: 135 TABLET | Refills: 1 | Status: SHIPPED | OUTPATIENT
Start: 2025-07-10

## (undated) DEVICE — TUBING, SUCTION, 1/4" X 10', STRAIGHT: Brand: MEDLINE

## (undated) DEVICE — GOWN,SIRUS,NON REINFRCD,LARGE,SET IN SL: Brand: MEDLINE

## (undated) DEVICE — SENSR O2 OXIMAX FNGR A/ 18IN NONSTR

## (undated) DEVICE — ADAPT CLN BIOGUARD AIR/H2O DISP

## (undated) DEVICE — SINGLE-USE BIOPSY FORCEPS: Brand: RADIAL JAW 4

## (undated) DEVICE — CANN O2 ETCO2 FITS ALL CONN CO2 SMPL A/ 7IN DISP LF

## (undated) DEVICE — KT ORCA ORCAPOD DISP STRL